# Patient Record
Sex: FEMALE | Race: WHITE | NOT HISPANIC OR LATINO
[De-identification: names, ages, dates, MRNs, and addresses within clinical notes are randomized per-mention and may not be internally consistent; named-entity substitution may affect disease eponyms.]

---

## 2018-11-21 ENCOUNTER — RECORD ABSTRACTING (OUTPATIENT)
Age: 73
End: 2018-11-21

## 2018-11-21 DIAGNOSIS — Z86.39 PERSONAL HISTORY OF OTHER ENDOCRINE, NUTRITIONAL AND METABOLIC DISEASE: ICD-10-CM

## 2018-11-21 DIAGNOSIS — S83.8X9A SPRAIN OF OTHER SPECIFIED PARTS OF UNSPECIFIED KNEE, INITIAL ENCOUNTER: ICD-10-CM

## 2018-11-21 DIAGNOSIS — Z86.19 PERSONAL HISTORY OF OTHER INFECTIOUS AND PARASITIC DISEASES: ICD-10-CM

## 2018-11-21 DIAGNOSIS — Z82.49 FAMILY HISTORY OF ISCHEMIC HEART DISEASE AND OTHER DISEASES OF THE CIRCULATORY SYSTEM: ICD-10-CM

## 2018-11-21 DIAGNOSIS — F17.200 NICOTINE DEPENDENCE, UNSPECIFIED, UNCOMPLICATED: ICD-10-CM

## 2018-11-21 DIAGNOSIS — Z86.79 PERSONAL HISTORY OF OTHER DISEASES OF THE CIRCULATORY SYSTEM: ICD-10-CM

## 2018-11-21 RX ORDER — PNV NO.95/FERROUS FUM/FOLIC AC 28MG-0.8MG
100 TABLET ORAL
Refills: 0 | Status: ACTIVE | COMMUNITY

## 2018-11-29 ENCOUNTER — APPOINTMENT (OUTPATIENT)
Dept: PAIN MANAGEMENT | Facility: CLINIC | Age: 73
End: 2018-11-29
Payer: MEDICARE

## 2018-11-29 VITALS
DIASTOLIC BLOOD PRESSURE: 70 MMHG | BODY MASS INDEX: 33.38 KG/M2 | SYSTOLIC BLOOD PRESSURE: 120 MMHG | HEIGHT: 60 IN | WEIGHT: 170 LBS

## 2018-11-29 PROCEDURE — 99214 OFFICE O/P EST MOD 30 MIN: CPT

## 2019-01-02 ENCOUNTER — APPOINTMENT (OUTPATIENT)
Dept: PAIN MANAGEMENT | Facility: CLINIC | Age: 74
End: 2019-01-02
Payer: MEDICARE

## 2019-01-02 VITALS
SYSTOLIC BLOOD PRESSURE: 134 MMHG | BODY MASS INDEX: 33.38 KG/M2 | WEIGHT: 170 LBS | DIASTOLIC BLOOD PRESSURE: 66 MMHG | HEIGHT: 60 IN

## 2019-01-02 PROCEDURE — 99214 OFFICE O/P EST MOD 30 MIN: CPT

## 2019-01-02 RX ORDER — NORTRIPTYLINE HYDROCHLORIDE 10 MG/1
10 CAPSULE ORAL
Refills: 0 | Status: DISCONTINUED | COMMUNITY
End: 2019-01-02

## 2019-01-02 RX ORDER — CYCLOBENZAPRINE HYDROCHLORIDE 10 MG/1
10 TABLET, FILM COATED ORAL
Refills: 0 | Status: DISCONTINUED | COMMUNITY
End: 2019-01-02

## 2019-01-02 RX ORDER — LIDOCAINE 50 MG/G
5 PATCH CUTANEOUS
Refills: 0 | Status: DISCONTINUED | COMMUNITY
End: 2019-01-02

## 2019-01-21 ENCOUNTER — TRANSCRIPTION ENCOUNTER (OUTPATIENT)
Age: 74
End: 2019-01-21

## 2019-03-04 ENCOUNTER — APPOINTMENT (OUTPATIENT)
Dept: PAIN MANAGEMENT | Facility: CLINIC | Age: 74
End: 2019-03-04

## 2019-03-15 ENCOUNTER — TRANSCRIPTION ENCOUNTER (OUTPATIENT)
Age: 74
End: 2019-03-15

## 2019-03-18 ENCOUNTER — APPOINTMENT (OUTPATIENT)
Dept: GASTROENTEROLOGY | Facility: CLINIC | Age: 74
End: 2019-03-18
Payer: MEDICARE

## 2019-03-18 VITALS
DIASTOLIC BLOOD PRESSURE: 60 MMHG | HEART RATE: 64 BPM | WEIGHT: 155 LBS | BODY MASS INDEX: 30.43 KG/M2 | SYSTOLIC BLOOD PRESSURE: 110 MMHG | HEIGHT: 60 IN

## 2019-03-18 DIAGNOSIS — M32.9 SYSTEMIC LUPUS ERYTHEMATOSUS, UNSPECIFIED: ICD-10-CM

## 2019-03-18 PROCEDURE — 99203 OFFICE O/P NEW LOW 30 MIN: CPT

## 2019-03-18 RX ORDER — GABAPENTIN 300 MG/1
300 CAPSULE ORAL
Refills: 0 | Status: DISCONTINUED | COMMUNITY
End: 2019-03-18

## 2019-03-18 RX ORDER — CHROMIUM 200 MCG
1000 TABLET ORAL
Refills: 0 | Status: ACTIVE | COMMUNITY

## 2019-03-18 RX ORDER — GABAPENTIN 300 MG/1
300 CAPSULE ORAL
Qty: 30 | Refills: 0 | Status: DISCONTINUED | COMMUNITY
Start: 2019-01-02 | End: 2019-03-18

## 2019-03-18 RX ORDER — CHOLECALCIFEROL (VITAMIN D3) 50 MCG
1000 CAPSULE ORAL
Refills: 0 | Status: ACTIVE | COMMUNITY

## 2019-03-18 NOTE — PHYSICAL EXAM
[General Appearance - Alert] : alert [General Appearance - In No Acute Distress] : in no acute distress [Sclera] : the sclera and conjunctiva were normal [Neck Appearance] : the appearance of the neck was normal [] : no respiratory distress [FreeTextEntry1] : deferred [Abdomen Soft] : soft [Abnormal Walk] : normal gait [No Focal Deficits] : no focal deficits [Skin Color & Pigmentation] : normal skin color and pigmentation [Oriented To Time, Place, And Person] : oriented to person, place, and time

## 2019-03-18 NOTE — CONSULT LETTER
[Dear  ___] : Dear  [unfilled], [Consult Letter:] : I had the pleasure of evaluating your patient, [unfilled]. [Please see my note below.] : Please see my note below. [Sincerely,] : Sincerely, [FreeTextEntry3] : David Lewis MD\par tel: [phone]\par fax: [phone]\par

## 2019-03-18 NOTE — HISTORY OF PRESENT ILLNESS
[de-identified] : ANDREW LOWERY  is being evaluated at the request of Dr. Haskinsfor an opinion re: history of colon polyp. Denies nausea, vomiting, fever, chills, diarrhea, constipation , melena, hematemesis. Colonoscopy in 2012  was notable for polyps ( adenoma)  / diverticulosis and hemorrhoids. Previously scheduled f/u colonoscopy was cancelled buy the patient. Colon findings in 2006 / 2002 were similar

## 2019-04-09 ENCOUNTER — APPOINTMENT (OUTPATIENT)
Dept: GASTROENTEROLOGY | Facility: HOSPITAL | Age: 74
End: 2019-04-09

## 2020-02-06 ENCOUNTER — APPOINTMENT (OUTPATIENT)
Dept: INTERNAL MEDICINE | Facility: CLINIC | Age: 75
End: 2020-02-06
Payer: MEDICARE

## 2020-02-06 VITALS
RESPIRATION RATE: 16 BRPM | OXYGEN SATURATION: 98 % | SYSTOLIC BLOOD PRESSURE: 116 MMHG | WEIGHT: 162 LBS | HEIGHT: 60 IN | HEART RATE: 73 BPM | TEMPERATURE: 97.6 F | DIASTOLIC BLOOD PRESSURE: 60 MMHG | BODY MASS INDEX: 31.8 KG/M2

## 2020-02-06 DIAGNOSIS — M54.2 CERVICALGIA: ICD-10-CM

## 2020-02-06 DIAGNOSIS — R92.2 INCONCLUSIVE MAMMOGRAM: ICD-10-CM

## 2020-02-06 DIAGNOSIS — Z86.79 PERSONAL HISTORY OF OTHER DISEASES OF THE CIRCULATORY SYSTEM: ICD-10-CM

## 2020-02-06 DIAGNOSIS — Z87.898 PERSONAL HISTORY OF OTHER SPECIFIED CONDITIONS: ICD-10-CM

## 2020-02-06 DIAGNOSIS — Z86.010 PERSONAL HISTORY OF COLONIC POLYPS: ICD-10-CM

## 2020-02-06 DIAGNOSIS — R94.39 ABNORMAL RESULT OF OTHER CARDIOVASCULAR FUNCTION STUDY: ICD-10-CM

## 2020-02-06 PROCEDURE — 99204 OFFICE O/P NEW MOD 45 MIN: CPT | Mod: 25

## 2020-02-06 PROCEDURE — G0009: CPT

## 2020-02-06 PROCEDURE — 90732 PPSV23 VACC 2 YRS+ SUBQ/IM: CPT

## 2020-02-06 RX ORDER — HYDROXYCHLOROQUINE SULFATE 200 MG/1
200 TABLET ORAL
Refills: 0 | Status: ACTIVE | COMMUNITY
Start: 2020-02-06

## 2020-02-06 NOTE — REVIEW OF SYSTEMS
[Joint Stiffness] : joint stiffness [Joint Pain] : joint pain [Joint Swelling] : no joint swelling [Skin Rash] : no skin rash [Negative] : Integumentary [FreeTextEntry9] : feet and arms

## 2020-02-06 NOTE — COUNSELING
[Willing to Quit Smoking] : Not willing to quit smoking [Risk of tobacco use and health benefits of smoking cessation discussed] : Risk of tobacco use and health benefits of smoking cessation discussed [Cessation strategies including cessation program discussed] : Cessation strategies including cessation program discussed

## 2020-03-13 ENCOUNTER — RESULT REVIEW (OUTPATIENT)
Age: 75
End: 2020-03-13

## 2020-03-23 ENCOUNTER — APPOINTMENT (OUTPATIENT)
Dept: INTERNAL MEDICINE | Facility: CLINIC | Age: 75
End: 2020-03-23
Payer: MEDICARE

## 2020-03-23 VITALS — SYSTOLIC BLOOD PRESSURE: 126 MMHG | DIASTOLIC BLOOD PRESSURE: 70 MMHG

## 2020-03-23 VITALS
TEMPERATURE: 98.1 F | WEIGHT: 156 LBS | HEIGHT: 60 IN | DIASTOLIC BLOOD PRESSURE: 88 MMHG | HEART RATE: 76 BPM | BODY MASS INDEX: 30.63 KG/M2 | SYSTOLIC BLOOD PRESSURE: 138 MMHG

## 2020-03-23 DIAGNOSIS — Z92.29 PERSONAL HISTORY OF OTHER DRUG THERAPY: ICD-10-CM

## 2020-03-23 DIAGNOSIS — Z76.89 PERSONS ENCOUNTERING HEALTH SERVICES IN OTHER SPECIFIED CIRCUMSTANCES: ICD-10-CM

## 2020-03-23 PROCEDURE — 99213 OFFICE O/P EST LOW 20 MIN: CPT | Mod: 25

## 2020-03-23 PROCEDURE — 99406 BEHAV CHNG SMOKING 3-10 MIN: CPT | Mod: 25

## 2020-03-23 PROCEDURE — G0439: CPT

## 2020-03-23 NOTE — COUNSELING
[Risk of tobacco use and health benefits of smoking cessation discussed] : Risk of tobacco use and health benefits of smoking cessation discussed [Cessation strategies including cessation program discussed] : Cessation strategies including cessation program discussed [Willing to Quit Smoking] : Not willing to quit smoking [Tobacco Use Cessation Intermediate Greater Than 3 Minutes Up to 10 Minutes] : Tobacco Use Cessation Intermediate Greater Than 3 Minutes Up to 10 Minutes [FreeTextEntry1] : 10

## 2020-03-23 NOTE — REVIEW OF SYSTEMS
[Negative] : Heme/Lymph [Itching] : no itching [Skin Rash] : skin rash [de-identified] : nack and eyelids, old

## 2020-03-23 NOTE — HEALTH RISK ASSESSMENT
[Good] : ~his/her~  mood as  good [] : Yes [6-10] : 6-10 [No] : No [No falls in past year] : Patient reported no falls in the past year [0] : 2) Feeling down, depressed, or hopeless: Not at all (0) [Patient reported mammogram was normal] : Patient reported mammogram was normal [Patient reported bone density results were abnormal] : Patient reported bone density results were abnormal [Patient reported colonoscopy was abnormal] : Patient reported colonoscopy was abnormal [HIV test declined] : HIV test declined [Hepatitis C test offered] : Hepatitis C test offered [With Family] : lives with family [Employed] : employed [College] : College [] :  [# Of Children ___] : has [unfilled] children [Feels Safe at Home] : Feels safe at home [With Patient/Caregiver] : With Patient/Caregiver [Designated Healthcare Proxy] : Designated healthcare proxy [Name: ___] : Health Care Proxy's Name: [unfilled]  [Relationship: ___] : Relationship: [unfilled] [[id number]] : 0 [MammogramDate] : 02/20 [PapSmearComments] : n/a [BoneDensityDate] : 01/14 [ColonoscopyDate] : 09/19 [ColonoscopyComments] : q2y [de-identified] : per dean [AdvancecareDate] : 03/23/20

## 2020-03-23 NOTE — PHYSICAL EXAM
[Normal] : normal sclera/conjunctiva, PERRL, EOMI [de-identified] : bilat dense no lumps [de-identified] : eyellid rash and post neck rash

## 2020-04-26 ENCOUNTER — MESSAGE (OUTPATIENT)
Age: 75
End: 2020-04-26

## 2020-05-01 ENCOUNTER — LABORATORY RESULT (OUTPATIENT)
Age: 75
End: 2020-05-01

## 2020-05-04 ENCOUNTER — RX RENEWAL (OUTPATIENT)
Age: 75
End: 2020-05-04

## 2020-05-05 LAB
ALBUMIN SERPL ELPH-MCNC: 4.4 G/DL
ALP BLD-CCNC: 65 U/L
ALT SERPL-CCNC: 17 U/L
ANION GAP SERPL CALC-SCNC: 18 MMOL/L
AST SERPL-CCNC: 23 U/L
BASOPHILS # BLD AUTO: 0.07 K/UL
BASOPHILS NFR BLD AUTO: 0.8 %
BILIRUB SERPL-MCNC: 0.2 MG/DL
BUN SERPL-MCNC: 22 MG/DL
CALCIUM SERPL-MCNC: 9.6 MG/DL
CHLORIDE SERPL-SCNC: 102 MMOL/L
CHOLEST SERPL-MCNC: 227 MG/DL
CHOLEST/HDLC SERPL: 4.7 RATIO
CO2 SERPL-SCNC: 19 MMOL/L
CREAT SERPL-MCNC: 0.74 MG/DL
EOSINOPHIL # BLD AUTO: 0.34 K/UL
EOSINOPHIL NFR BLD AUTO: 4.1 %
FOLATE SERPL-MCNC: >20 NG/ML
GLUCOSE SERPL-MCNC: 96 MG/DL
HBV SURFACE AB SER QL: REACTIVE
HCT VFR BLD CALC: 39.2 %
HDLC SERPL-MCNC: 48 MG/DL
HGB BLD-MCNC: 11.9 G/DL
IMM GRANULOCYTES NFR BLD AUTO: 0.4 %
LDLC SERPL CALC-MCNC: 149 MG/DL
LYMPHOCYTES # BLD AUTO: 1.82 K/UL
LYMPHOCYTES NFR BLD AUTO: 21.9 %
MAN DIFF?: NORMAL
MCHC RBC-ENTMCNC: 20 PG
MCHC RBC-ENTMCNC: 30.4 GM/DL
MCV RBC AUTO: 65.8 FL
MONOCYTES # BLD AUTO: 0.87 K/UL
MONOCYTES NFR BLD AUTO: 10.5 %
NEUTROPHILS # BLD AUTO: 5.17 K/UL
NEUTROPHILS NFR BLD AUTO: 62.3 %
PLATELET # BLD AUTO: 185 K/UL
POTASSIUM SERPL-SCNC: 4.6 MMOL/L
PROT SERPL-MCNC: 6.8 G/DL
RBC # BLD: 5.96 M/UL
RBC # FLD: 18 %
SODIUM SERPL-SCNC: 140 MMOL/L
T4 FREE SERPL-MCNC: 1.3 NG/DL
TRIGL SERPL-MCNC: 150 MG/DL
TSH SERPL-ACNC: 2.66 UIU/ML
WBC # FLD AUTO: 8.3 K/UL

## 2020-08-01 ENCOUNTER — RESULT REVIEW (OUTPATIENT)
Age: 75
End: 2020-08-01

## 2020-08-04 ENCOUNTER — TRANSCRIPTION ENCOUNTER (OUTPATIENT)
Age: 75
End: 2020-08-04

## 2020-08-26 ENCOUNTER — RESULT REVIEW (OUTPATIENT)
Age: 75
End: 2020-08-26

## 2020-11-02 ENCOUNTER — RX RENEWAL (OUTPATIENT)
Age: 75
End: 2020-11-02

## 2021-01-26 ENCOUNTER — RX RENEWAL (OUTPATIENT)
Age: 76
End: 2021-01-26

## 2021-02-02 ENCOUNTER — TRANSCRIPTION ENCOUNTER (OUTPATIENT)
Age: 76
End: 2021-02-02

## 2021-03-15 ENCOUNTER — RESULT REVIEW (OUTPATIENT)
Age: 76
End: 2021-03-15

## 2021-03-29 ENCOUNTER — APPOINTMENT (OUTPATIENT)
Dept: INTERNAL MEDICINE | Facility: CLINIC | Age: 76
End: 2021-03-29
Payer: MEDICARE

## 2021-03-29 VITALS
OXYGEN SATURATION: 98 % | HEIGHT: 60 IN | HEART RATE: 81 BPM | SYSTOLIC BLOOD PRESSURE: 122 MMHG | TEMPERATURE: 95.3 F | BODY MASS INDEX: 32.79 KG/M2 | DIASTOLIC BLOOD PRESSURE: 70 MMHG | WEIGHT: 167 LBS

## 2021-03-29 PROCEDURE — G0296 VISIT TO DETERM LDCT ELIG: CPT

## 2021-03-29 PROCEDURE — 99214 OFFICE O/P EST MOD 30 MIN: CPT | Mod: 25

## 2021-03-29 PROCEDURE — G0438: CPT

## 2021-03-30 NOTE — REVIEW OF SYSTEMS
[Skin Rash] : skin rash [Negative] : Heme/Lymph [Hair Changes] : hair changes [Itching] : no itching [de-identified] : neck and eyelids, old, hair improved

## 2021-03-30 NOTE — COUNSELING
[Risk of tobacco use and health benefits of smoking cessation discussed] : Risk of tobacco use and health benefits of smoking cessation discussed [ - Annual Lung Cancer Screening/Share Decision Making Discussion] : Annual Lung Cancer Screening/Share Decision Making Discussion. (I have advised this patient to have a Low Dose CT (LDCT) scan of the lungs and have discussed the following with the patient in a shared decision making discussion:   Benefits of Detection and Early Treatment: There is adequate evidence that annual screening for lung cancer with LDCT in a population of high-risk persons can prevent a substantial number of lung cancer–related deaths. The magnitude of benefit depends on the individual patient's risk for lung cancer, as those who are at highest risk are most likely to benefit. Screening cannot prevent most lung cancer–related deaths, and does not replace smoking cessation. Harms of Detection and Early Intervention and Treatment: The harms associated with LDCT screening include false-negative and false-positive results, incidental findings, over diagnosis, and radiation exposure. False-positive LDCT results occur in a substantial proportion of screened persons; 95% of all positive results do not lead to a diagnosis of cancer. In a high-quality screening program, further imaging can resolve most false-positive results; however, some patients may require invasive procedures. Radiation harms, including cancer resulting from cumulative exposure to radiation, vary depending on the age at the start of screening; the number of scans received; and the person's exposure to other sources of radiation, particularly other medical imaging.) [Willing to Quit Smoking] : Not willing to quit smoking [FreeTextEntry1] : 5

## 2021-03-30 NOTE — PHYSICAL EXAM
[Obese] : patient was observed to be obese [Normal] : normal gait, coordination grossly intact, no focal deficits [de-identified] : bilat dense no lumps [de-identified] : eyelid rash and post neck rash

## 2021-03-30 NOTE — HEALTH RISK ASSESSMENT
[Good] : ~his/her~  mood as  good [] : Yes [6-10] : 6-10 [No] : No [Patient reported mammogram was normal] : Patient reported mammogram was normal [Patient reported bone density results were abnormal] : Patient reported bone density results were abnormal [Patient reported colonoscopy was normal] : Patient reported colonoscopy was normal [HIV test declined] : HIV test declined [Hepatitis C test offered] : Hepatitis C test offered [With Family] : lives with family [Retired] : retired [College] : College [] :  [# Of Children ___] : has [unfilled] children [With Patient/Caregiver] : With Patient/Caregiver [Designated Healthcare Proxy] : Designated healthcare proxy [Name: ___] : Health Care Proxy's Name: [unfilled]  [Relationship: ___] : Relationship: [unfilled] [YearQuit] : current [MammogramDate] : 01/21 [PapSmearComments] : n/a [BoneDensityDate] : 01/20 [BoneDensityComments] : openia [ColonoscopyDate] : 01/20 [ColonoscopyComments] : q2y [AdvancecareDate] : 03/29/21

## 2021-04-05 ENCOUNTER — APPOINTMENT (OUTPATIENT)
Dept: THORACIC SURGERY | Facility: CLINIC | Age: 76
End: 2021-04-05
Payer: MEDICARE

## 2021-04-05 VITALS — BODY MASS INDEX: 30.43 KG/M2 | WEIGHT: 155 LBS | HEIGHT: 60 IN

## 2021-04-05 PROCEDURE — G0296 VISIT TO DETERM LDCT ELIG: CPT

## 2021-04-05 NOTE — PLAN
[Smoking Cessation] : smoking cessation [FreeTextEntry1] : Plan:\par \par -Low dose CT chest for lung cancer screening.    VIRGILIO MULTANI has been CC'd in order for LDCT.\par \par -Follow up with patient and her referring provider after her LDCT results have been reviewed by the multidisciplinary clinical team.\par \par -Encouraged smoking cessation.\par \par -Patient declines referral to CTC and or CCX\par \par Should I screen? tool utilized. 6 Year risk of lung cancer is 8.9%. Patient wishes to proceed with screening.\par \par Engaged in discussion regarding risks of screening during Covid-19 pandemic and precautions that are being used  to reduce exposure.\par \par Engaged in shared decision making with Ms. LOWERY . Answered all questions. She verbalized understanding and agreement. She knows to call back with and questions or concerns.\par

## 2021-04-05 NOTE — ASSESSMENT
[Discussed Risks and Advised to Quit Smoking] : Discussed risks and advised to quit smoking [Discussed Cessation Medication] : cessation medication was discussed [Not Ready] : Patient is not ready for cessation intervention [de-identified] : Acknowledged how  difficult it is to quit smoking. Advised that quitting smoking is the most important thing a person can do for their health. She reports using NRT patch in the past and was successful but started developing a localized reaction most likely from the adhesive. As she continued to use NRT patch she began having circumoral tingling with each use and stopped using. She is considering smoking cessation and verbalizes the need to quit but is concerned about weight gain and is not ready to quit at this time. She agrees to contact writer when ready to discuss smoking cessation further.

## 2021-04-05 NOTE — REASON FOR VISIT
[Home] : at home, [unfilled] , at the time of the visit. [Medical Office: ([facility])___] : at the medical office located in  [Verbal consent obtained from patient] : the patient, [unfilled] [Initial Evaluation] : an initial evaluation visit [Review of Eligibility] : review of eligibility [Low-Dose CT Screening Discussion] : low-dose CT lung cancer screening discussion

## 2021-04-05 NOTE — HISTORY OF PRESENT ILLNESS
[Current] : current smoker [_____ pack-years] : [unfilled] pack-years [TextBox_13] : Referred by Dr. Villela\par \par ANDREW LOWERY had telephonic visit for a review of eligibility and discussion of the Low dose CT lung cancer screening program. A telephonic visit occurred due to the patient not having access to a smart phone or a computer for an audio/visual visit.  The following was reviewed and confirmed the patient meets screening eligibility criteria.\par -Age 75 year\par Smoking Status:\par -Current smoker\par Stared smoking at 16 years old. Smoke 1PPD for 52 years, then a little less than 1/2 PPD for 8 years.\par -Number of pack years smokin \par \par Ms. LOWERY   denies any signs or symptoms of lung cancer including new cough, changing cough, hemoptysis, and unintentional weight loss.\par \par Ms. LOWERY  denies any personal history of lung cancer. Reports no lung cancer in a 1st degree relative. Denies and history of lung disease. Reports DX  Lupus, rheumatoid arthritis and thyroid nodule. Having thyroid 2021. Denies any history of  occupational exposures.\par

## 2021-04-06 ENCOUNTER — RESULT REVIEW (OUTPATIENT)
Age: 76
End: 2021-04-06

## 2021-04-07 ENCOUNTER — TRANSCRIPTION ENCOUNTER (OUTPATIENT)
Age: 76
End: 2021-04-07

## 2021-04-23 ENCOUNTER — RESULT REVIEW (OUTPATIENT)
Age: 76
End: 2021-04-23

## 2021-05-07 ENCOUNTER — NON-APPOINTMENT (OUTPATIENT)
Age: 76
End: 2021-05-07

## 2021-06-09 ENCOUNTER — LABORATORY RESULT (OUTPATIENT)
Age: 76
End: 2021-06-09

## 2021-06-10 LAB
25(OH)D3 SERPL-MCNC: 56.5 NG/ML
ALBUMIN SERPL ELPH-MCNC: 4.8 G/DL
ALP BLD-CCNC: 65 U/L
ALT SERPL-CCNC: 16 U/L
ANION GAP SERPL CALC-SCNC: 11 MMOL/L
APPEARANCE: CLEAR
AST SERPL-CCNC: 23 U/L
BASOPHILS # BLD AUTO: 0.09 K/UL
BASOPHILS NFR BLD AUTO: 1 %
BILIRUB SERPL-MCNC: 0.5 MG/DL
BILIRUBIN URINE: NEGATIVE
BLOOD URINE: NEGATIVE
BUN SERPL-MCNC: 23 MG/DL
CALCIUM SERPL-MCNC: 9.8 MG/DL
CHLORIDE SERPL-SCNC: 102 MMOL/L
CHOLEST SERPL-MCNC: 253 MG/DL
CO2 SERPL-SCNC: 25 MMOL/L
COLOR: YELLOW
CREAT SERPL-MCNC: 0.88 MG/DL
EOSINOPHIL # BLD AUTO: 0.31 K/UL
EOSINOPHIL NFR BLD AUTO: 3.6 %
GLUCOSE QUALITATIVE U: NEGATIVE
GLUCOSE SERPL-MCNC: 94 MG/DL
HCT VFR BLD CALC: 42.7 %
HCV AB SER QL: NONREACTIVE
HCV S/CO RATIO: 0.15 S/CO
HDLC SERPL-MCNC: 44 MG/DL
HGB BLD-MCNC: 13.1 G/DL
IMM GRANULOCYTES NFR BLD AUTO: 0.3 %
KETONES URINE: NEGATIVE
LDLC SERPL CALC-MCNC: 171 MG/DL
LEUKOCYTE ESTERASE URINE: ABNORMAL
LYMPHOCYTES # BLD AUTO: 2.16 K/UL
LYMPHOCYTES NFR BLD AUTO: 25.1 %
MAN DIFF?: NORMAL
MCHC RBC-ENTMCNC: 19.7 PG
MCHC RBC-ENTMCNC: 30.7 GM/DL
MCV RBC AUTO: 64.2 FL
MONOCYTES # BLD AUTO: 0.98 K/UL
MONOCYTES NFR BLD AUTO: 11.4 %
NEUTROPHILS # BLD AUTO: 5.05 K/UL
NEUTROPHILS NFR BLD AUTO: 58.6 %
NITRITE URINE: NEGATIVE
NONHDLC SERPL-MCNC: 208 MG/DL
PH URINE: 5.5
PLATELET # BLD AUTO: 173 K/UL
POTASSIUM SERPL-SCNC: 4.7 MMOL/L
PROT SERPL-MCNC: 7 G/DL
PROTEIN URINE: NORMAL
RBC # BLD: 6.65 M/UL
RBC # FLD: 18.1 %
SODIUM SERPL-SCNC: 139 MMOL/L
SPECIFIC GRAVITY URINE: 1.02
T4 FREE SERPL-MCNC: 1.4 NG/DL
TRIGL SERPL-MCNC: 183 MG/DL
TSH SERPL-ACNC: 0.01 UIU/ML
UROBILINOGEN URINE: NORMAL
VIT B12 SERPL-MCNC: 911 PG/ML
WBC # FLD AUTO: 8.62 K/UL

## 2021-06-10 RX ORDER — LEVOTHYROXINE SODIUM 0.07 MG/1
75 TABLET ORAL
Qty: 90 | Refills: 0 | Status: DISCONTINUED | COMMUNITY
Start: 2020-05-04 | End: 2021-06-10

## 2021-07-20 ENCOUNTER — TRANSCRIPTION ENCOUNTER (OUTPATIENT)
Age: 76
End: 2021-07-20

## 2021-07-21 ENCOUNTER — TRANSCRIPTION ENCOUNTER (OUTPATIENT)
Age: 76
End: 2021-07-21

## 2021-07-26 LAB
T4 FREE SERPL-MCNC: 1.2 NG/DL
TSH SERPL-ACNC: 3 UIU/ML

## 2021-09-07 ENCOUNTER — RX RENEWAL (OUTPATIENT)
Age: 76
End: 2021-09-07

## 2021-09-10 ENCOUNTER — TRANSCRIPTION ENCOUNTER (OUTPATIENT)
Age: 76
End: 2021-09-10

## 2021-09-13 ENCOUNTER — TRANSCRIPTION ENCOUNTER (OUTPATIENT)
Age: 76
End: 2021-09-13

## 2021-09-22 LAB
T4 FREE SERPL-MCNC: 1.1 NG/DL
TSH SERPL-ACNC: 3.43 UIU/ML

## 2021-10-04 ENCOUNTER — NON-APPOINTMENT (OUTPATIENT)
Age: 76
End: 2021-10-04

## 2021-10-13 ENCOUNTER — RESULT REVIEW (OUTPATIENT)
Age: 76
End: 2021-10-13

## 2022-02-03 ENCOUNTER — APPOINTMENT (OUTPATIENT)
Dept: INTERNAL MEDICINE | Facility: CLINIC | Age: 77
End: 2022-02-03
Payer: MEDICARE

## 2022-02-03 VITALS
BODY MASS INDEX: 31.41 KG/M2 | TEMPERATURE: 96.5 F | DIASTOLIC BLOOD PRESSURE: 70 MMHG | SYSTOLIC BLOOD PRESSURE: 132 MMHG | OXYGEN SATURATION: 96 % | HEART RATE: 75 BPM | HEIGHT: 60 IN | WEIGHT: 160 LBS

## 2022-02-03 DIAGNOSIS — Z71.85 ENCOUNTER FOR IMMUNIZATION SAFETY COUNSELING: ICD-10-CM

## 2022-02-03 PROCEDURE — 99214 OFFICE O/P EST MOD 30 MIN: CPT

## 2022-02-03 NOTE — HISTORY OF PRESENT ILLNESS
[de-identified] : Roula comes in today concerned about changing in her TSH level last year.  She is concerned is related to her COVID vaccine and would like to not take the booster vaccine.  She is a smoker with 3 lung nodules under observation and immunosuppression from lupus on active treatment.  She feels a little sluggish on current dose levothyroxine.  But she stopped biotin that in retrospect possibly  interfered with TSH measuring.

## 2022-02-03 NOTE — HEALTH RISK ASSESSMENT
[Current] : Current [No] : No [0] : 2) Feeling down, depressed, or hopeless: Not at all (0) [PHQ-2 Negative - No further assessment needed] : PHQ-2 Negative - No further assessment needed [[id number]] : 0

## 2022-04-28 ENCOUNTER — NON-APPOINTMENT (OUTPATIENT)
Age: 77
End: 2022-04-28

## 2022-04-28 ENCOUNTER — APPOINTMENT (OUTPATIENT)
Dept: INTERNAL MEDICINE | Facility: CLINIC | Age: 77
End: 2022-04-28
Payer: MEDICARE

## 2022-04-28 VITALS
OXYGEN SATURATION: 97 % | HEIGHT: 60 IN | HEART RATE: 84 BPM | WEIGHT: 160 LBS | TEMPERATURE: 96.1 F | SYSTOLIC BLOOD PRESSURE: 122 MMHG | DIASTOLIC BLOOD PRESSURE: 60 MMHG | BODY MASS INDEX: 31.41 KG/M2

## 2022-04-28 DIAGNOSIS — Z86.69 PERSONAL HISTORY OF OTHER DISEASES OF THE NERVOUS SYSTEM AND SENSE ORGANS: ICD-10-CM

## 2022-04-28 DIAGNOSIS — M47.812 SPONDYLOSIS W/OUT MYELOPATHY OR RADICULOPATHY, CERVICAL REGION: ICD-10-CM

## 2022-04-28 DIAGNOSIS — Z00.00 ENCOUNTER FOR GENERAL ADULT MEDICAL EXAMINATION W/OUT ABNORMAL FINDINGS: ICD-10-CM

## 2022-04-28 PROCEDURE — 93000 ELECTROCARDIOGRAM COMPLETE: CPT

## 2022-04-28 PROCEDURE — 99397 PER PM REEVAL EST PAT 65+ YR: CPT | Mod: 25

## 2022-04-28 NOTE — PHYSICAL EXAM
[Obese] : patient was observed to be obese [Normal] : normal gait, coordination grossly intact, no focal deficits [de-identified] : bilat dense no  gross lumps [de-identified] :  post neck discoloration

## 2022-04-28 NOTE — HISTORY OF PRESENT ILLNESS
[de-identified] : here for annual exam , doing well, still smoking , not willing to take statin for high lipids, ("I do not like pills")

## 2022-04-28 NOTE — HEALTH RISK ASSESSMENT
[Good] : ~his/her~  mood as  good [Current] : Current [10-14] : 10-14 [No] : No [0] : 2) Feeling down, depressed, or hopeless: Not at all (0) [PHQ-2 Negative - No further assessment needed] : PHQ-2 Negative - No further assessment needed [Patient reported mammogram was normal] : Patient reported mammogram was normal [Patient reported bone density results were abnormal] : Patient reported bone density results were abnormal [Patient reported colonoscopy was normal] : Patient reported colonoscopy was normal [Hepatitis C test offered] : Hepatitis C test offered [With Family] : lives with family [Employed] : employed [College] : College [] :  [# Of Children ___] : has [unfilled] children [[id number]] : 0 [MammogramDate] : 01/21 [BoneDensityDate] : 01/20 [ColonoscopyDate] : 01/19 [ColonoscopyComments] : 5 yr f /u

## 2022-04-28 NOTE — REVIEW OF SYSTEMS
[Negative] : Heme/Lymph [Headache] : no headache [Dizziness] : no dizziness [Memory Loss] : no memory loss [de-identified] : paresthesias R post neck and uper back after shingles

## 2022-05-06 ENCOUNTER — RX RENEWAL (OUTPATIENT)
Age: 77
End: 2022-05-06

## 2022-05-23 ENCOUNTER — RESULT REVIEW (OUTPATIENT)
Age: 77
End: 2022-05-23

## 2022-07-12 ENCOUNTER — LABORATORY RESULT (OUTPATIENT)
Age: 77
End: 2022-07-12

## 2022-07-13 LAB
ALBUMIN SERPL ELPH-MCNC: 4.5 G/DL
ALP BLD-CCNC: 65 U/L
ALT SERPL-CCNC: 17 U/L
ANION GAP SERPL CALC-SCNC: 12 MMOL/L
APPEARANCE: CLEAR
AST SERPL-CCNC: 23 U/L
BILIRUB SERPL-MCNC: 0.3 MG/DL
BILIRUBIN URINE: NEGATIVE
BLOOD URINE: NEGATIVE
BUN SERPL-MCNC: 19 MG/DL
CALCIUM SERPL-MCNC: 9.6 MG/DL
CHLORIDE SERPL-SCNC: 103 MMOL/L
CHOLEST SERPL-MCNC: 243 MG/DL
CO2 SERPL-SCNC: 24 MMOL/L
COLOR: YELLOW
CREAT SERPL-MCNC: 1 MG/DL
EGFR: 58 ML/MIN/1.73M2
ESTIMATED AVERAGE GLUCOSE: 108 MG/DL
FOLATE SERPL-MCNC: >20 NG/ML
GLUCOSE QUALITATIVE U: NEGATIVE
GLUCOSE SERPL-MCNC: 87 MG/DL
HBA1C MFR BLD HPLC: 5.4 %
HDLC SERPL-MCNC: 51 MG/DL
KETONES URINE: NEGATIVE
LDLC SERPL CALC-MCNC: 172 MG/DL
LEUKOCYTE ESTERASE URINE: ABNORMAL
NITRITE URINE: NEGATIVE
NONHDLC SERPL-MCNC: 192 MG/DL
PH URINE: 5.5
POTASSIUM SERPL-SCNC: 4.9 MMOL/L
PROT SERPL-MCNC: 6.7 G/DL
PROTEIN URINE: ABNORMAL
SODIUM SERPL-SCNC: 140 MMOL/L
SPECIFIC GRAVITY URINE: 1.02
T4 FREE SERPL-MCNC: 1.4 NG/DL
TRIGL SERPL-MCNC: 102 MG/DL
TSH SERPL-ACNC: 1.52 UIU/ML
UROBILINOGEN URINE: NORMAL
VIT B12 SERPL-MCNC: 1048 PG/ML

## 2022-07-14 LAB
BASOPHILS # BLD AUTO: 0.08 K/UL
BASOPHILS NFR BLD AUTO: 1 %
EOSINOPHIL # BLD AUTO: 0.18 K/UL
EOSINOPHIL NFR BLD AUTO: 2.2 %
HCT VFR BLD CALC: 38.8 %
HGB BLD-MCNC: 12.1 G/DL
IMM GRANULOCYTES NFR BLD AUTO: 0.2 %
LYMPHOCYTES # BLD AUTO: 1.43 K/UL
LYMPHOCYTES NFR BLD AUTO: 17.4 %
MAN DIFF?: NORMAL
MCHC RBC-ENTMCNC: 19.7 PG
MCHC RBC-ENTMCNC: 31.2 GM/DL
MCV RBC AUTO: 63.3 FL
MONOCYTES # BLD AUTO: 0.68 K/UL
MONOCYTES NFR BLD AUTO: 8.3 %
NEUTROPHILS # BLD AUTO: 5.84 K/UL
NEUTROPHILS NFR BLD AUTO: 70.9 %
PLATELET # BLD AUTO: 172 K/UL
RBC # BLD: 6.13 M/UL
RBC # FLD: 18.3 %
WBC # FLD AUTO: 8.23 K/UL

## 2022-08-05 ENCOUNTER — RESULT REVIEW (OUTPATIENT)
Age: 77
End: 2022-08-05

## 2022-09-26 ENCOUNTER — RX RENEWAL (OUTPATIENT)
Age: 77
End: 2022-09-26

## 2022-09-26 ENCOUNTER — TRANSCRIPTION ENCOUNTER (OUTPATIENT)
Age: 77
End: 2022-09-26

## 2022-11-07 ENCOUNTER — RX RENEWAL (OUTPATIENT)
Age: 77
End: 2022-11-07

## 2022-11-08 ENCOUNTER — TRANSCRIPTION ENCOUNTER (OUTPATIENT)
Age: 77
End: 2022-11-08

## 2022-11-14 ENCOUNTER — APPOINTMENT (OUTPATIENT)
Dept: THORACIC SURGERY | Facility: CLINIC | Age: 77
End: 2022-11-14

## 2022-11-14 VITALS — WEIGHT: 155 LBS | BODY MASS INDEX: 30.43 KG/M2 | HEIGHT: 60 IN

## 2022-11-14 PROCEDURE — ACP01: CPT | Mod: NC

## 2022-11-14 NOTE — ASSESSMENT
[Declined] : Patient has declined cessation intervention [de-identified] : Is trying to cut down. Verbalizes the need to quit smoking.

## 2022-11-14 NOTE — DATA REVIEWED
[Lung Cancer Screening] : Patient underwent lung cancer screening [3] : 3 [2] : 2 [TextBox_12] : 04/21 [TextBox_27] : 10/21 [TextBox_52] : 2

## 2022-11-14 NOTE — HISTORY OF PRESENT ILLNESS
[Current] : Current [TextBox_13] : Referred by Dr. Villela\par \par ANDREW LOWERY had telephonic visit for a review of eligibility and discussion of the Low dose CT lung cancer screening program. A telephonic visit occurred due to the patient not having access to a smart phone or a computer for an audio/visual visit. The following was reviewed and confirmed the patient meets screening eligibility criteria.\par Smoking Status:\par -Current smoker\par Stared smoking at 16 years old. Smoke 1 PPD for 52 years, then a little less than 1/2 PPD for 8 years. Then 8 cigarettes per day for the past year\par -Number of pack years smokin \par \par Ms. LOWERY denies any signs or symptoms of lung cancer including new cough, changing cough, hemoptysis, and unintentional weight loss.\par \par Ms. LOWERY reports DX Lupus, rheumatoid arthritis, hypothyroid, and thyroid nodule. denies She any personal history of lung cancer. Reports no lung cancer in a 1st degree relative. Reports no lung cancer in a 2nd degree relative. Denies and history of lung disease. Denies any history of occupational exposures. Denies exposure to 2nd hand smoke\par  [PacksperYear] : 56

## 2022-11-14 NOTE — PLAN
[Smoking Cessation] : smoking cessation [FreeTextEntry1] : Plan:\par \par -Low dose CT chest for lung cancer screening. VIRGILIO MULTANI ordered the low dose CT.      \par \par -Follow up with patient and her referring provider after her LDCT results have been reviewed by the multidisciplinary clinical team, if needed.\par \par -Encouraged smoking cessation.\par \par -Patient declines referral to CTC and CCX\par \par Should I screen? tool utilized. 6 Year risk of lung cancer is  10.5 %. \par \par Patient wishes to proceed with screening.\par \par Engaged in discussion regarding risks of screening during Covid-19 pandemic and precautions that are being used  to reduce exposure.\par \par Engaged in shared decision making with Ms. LOWERY . Answered all questions. She verbalized understanding and agreement. She knows to call back with and questions or concerns.\par

## 2022-11-21 ENCOUNTER — RESULT REVIEW (OUTPATIENT)
Age: 77
End: 2022-11-21

## 2022-11-23 ENCOUNTER — NON-APPOINTMENT (OUTPATIENT)
Age: 77
End: 2022-11-23

## 2022-12-21 ENCOUNTER — TRANSCRIPTION ENCOUNTER (OUTPATIENT)
Age: 77
End: 2022-12-21

## 2023-03-27 ENCOUNTER — RX RENEWAL (OUTPATIENT)
Age: 78
End: 2023-03-27

## 2023-05-08 ENCOUNTER — APPOINTMENT (OUTPATIENT)
Dept: INTERNAL MEDICINE | Facility: CLINIC | Age: 78
End: 2023-05-08
Payer: MEDICARE

## 2023-05-08 VITALS
BODY MASS INDEX: 33.06 KG/M2 | HEIGHT: 59 IN | HEART RATE: 81 BPM | OXYGEN SATURATION: 95 % | WEIGHT: 164 LBS | DIASTOLIC BLOOD PRESSURE: 60 MMHG | TEMPERATURE: 97.2 F | SYSTOLIC BLOOD PRESSURE: 100 MMHG

## 2023-05-08 DIAGNOSIS — Z23 ENCOUNTER FOR IMMUNIZATION: ICD-10-CM

## 2023-05-08 DIAGNOSIS — R91.1 SOLITARY PULMONARY NODULE: ICD-10-CM

## 2023-05-08 DIAGNOSIS — Z86.39 PERSONAL HISTORY OF OTHER ENDOCRINE, NUTRITIONAL AND METABOLIC DISEASE: ICD-10-CM

## 2023-05-08 DIAGNOSIS — L93.0 DISCOID LUPUS ERYTHEMATOSUS: ICD-10-CM

## 2023-05-08 PROCEDURE — 99407 BEHAV CHNG SMOKING > 10 MIN: CPT

## 2023-05-08 PROCEDURE — G0439: CPT

## 2023-05-08 PROCEDURE — G0009: CPT

## 2023-05-08 PROCEDURE — 99214 OFFICE O/P EST MOD 30 MIN: CPT | Mod: 25

## 2023-05-08 PROCEDURE — 90677 PCV20 VACCINE IM: CPT

## 2023-05-08 RX ORDER — ROSUVASTATIN CALCIUM 5 MG/1
5 TABLET, FILM COATED ORAL
Qty: 90 | Refills: 0 | Status: DISCONTINUED | COMMUNITY
Start: 2022-07-14 | End: 2023-05-08

## 2023-05-08 NOTE — COUNSELING
[Yes] : Risk of tobacco use and health benefits of smoking cessation discussed: Yes [No] : Not willing to quit smoking [FreeTextEntry2] : refused [FreeTextEntry3] : 12

## 2023-05-08 NOTE — PHYSICAL EXAM
[Obese] : patient was observed to be obese [Normal] : normal gait, coordination grossly intact, no focal deficits [de-identified] : bilat dense no  gross lumps bilat [de-identified] : R foot deformity , valgus deviation [de-identified] :  post neck discoloration

## 2023-05-08 NOTE — HISTORY OF PRESENT ILLNESS
[de-identified] : here for wellness exam, R chronic foot pain, did not start Crestor  or see the cardiologist, still smokes, but less 1/2 ppd

## 2023-05-08 NOTE — REVIEW OF SYSTEMS
[Negative] : Heme/Lymph [Joint Pain] : joint pain [Headache] : no headache [Dizziness] : no dizziness [Memory Loss] : no memory loss [FreeTextEntry9] : R ankle and foot after multiple injuries [de-identified] : occasional dysesthesias R post neck and upper back after shingles

## 2023-05-08 NOTE — HEALTH RISK ASSESSMENT
[Good] : ~his/her~  mood as  good [No] : No [No falls in past year] : Patient reported no falls in the past year [0] : 2) Feeling down, depressed, or hopeless: Not at all (0) [PHQ-2 Negative - No further assessment needed] : PHQ-2 Negative - No further assessment needed [Patient reported mammogram was normal] : Patient reported mammogram was normal [Patient reported bone density results were abnormal] : Patient reported bone density results were abnormal [Patient reported colonoscopy was normal] : Patient reported colonoscopy was normal [HIV test declined] : HIV test declined [Hepatitis C test offered] : Hepatitis C test offered [With Family] : lives with family [Employed] : employed [College] : College [] :  [# Of Children ___] : has [unfilled] children [Fully functional (bathing, dressing, toileting, transferring, walking, feeding)] : Fully functional (bathing, dressing, toileting, transferring, walking, feeding) [Fully functional (using the telephone, shopping, preparing meals, housekeeping, doing laundry, using] : Fully functional and needs no help or supervision to perform IADLs (using the telephone, shopping, preparing meals, housekeeping, doing laundry, using transportation, managing medications and managing finances) [Patient/Caregiver not ready to engage] : , patient/caregiver not ready to engage [Current] : Current [10-14] : 10-14 [[id number]] : 0 [Reports changes in hearing] : Reports no changes in hearing [Reports changes in vision] : Reports no changes in vision [MammogramDate] : 01/22 [BoneDensityDate] : 01/20 [BoneDensityComments] : osteopenia [ColonoscopyDate] : 01/19

## 2023-05-24 ENCOUNTER — RESULT REVIEW (OUTPATIENT)
Age: 78
End: 2023-05-24

## 2023-08-04 ENCOUNTER — TRANSCRIPTION ENCOUNTER (OUTPATIENT)
Age: 78
End: 2023-08-04

## 2023-08-07 ENCOUNTER — TRANSCRIPTION ENCOUNTER (OUTPATIENT)
Age: 78
End: 2023-08-07

## 2023-09-02 ENCOUNTER — RESULT REVIEW (OUTPATIENT)
Age: 78
End: 2023-09-02

## 2023-09-27 ENCOUNTER — TRANSCRIPTION ENCOUNTER (OUTPATIENT)
Age: 78
End: 2023-09-27

## 2023-10-04 ENCOUNTER — TRANSCRIPTION ENCOUNTER (OUTPATIENT)
Age: 78
End: 2023-10-04

## 2023-10-11 ENCOUNTER — TRANSCRIPTION ENCOUNTER (OUTPATIENT)
Age: 78
End: 2023-10-11

## 2024-01-10 ENCOUNTER — RX RENEWAL (OUTPATIENT)
Age: 79
End: 2024-01-10

## 2024-01-12 ENCOUNTER — TRANSCRIPTION ENCOUNTER (OUTPATIENT)
Age: 79
End: 2024-01-12

## 2024-01-12 ENCOUNTER — APPOINTMENT (OUTPATIENT)
Dept: THORACIC SURGERY | Facility: CLINIC | Age: 79
End: 2024-01-12
Payer: MEDICARE

## 2024-01-12 VITALS — BODY MASS INDEX: 31.41 KG/M2 | HEIGHT: 60 IN | WEIGHT: 160 LBS

## 2024-01-12 PROCEDURE — ACP01: CPT | Mod: NC

## 2024-01-12 NOTE — PLAN
[Smoking Cessation] : smoking cessation [FreeTextEntry1] : Plan:  -Low dose CT chest for lung cancer screening. VIRGILIO MULTANI ordered the low dose CT.       -Follow up with  her referring provider after her LDCT results have been reviewed by the multidisciplinary clinical team, if needed.   -Encouraged smoking cessation.   -Patient declines referral to CTC and CCX   Should I screen? tool utilized. 6 Year risk of lung cancer is   11.9%.   Patient wishes to proceed with screening.  Engaged in discussion regarding risks of screening during Covid-19 pandemic and precautions that are being used  to reduce exposure.  Engaged in shared decision making with MsKianna BEVERLEY . Answered all questions. She verbalized understanding and agreement. She knows to call back with and questions or concerns.

## 2024-01-12 NOTE — ASSESSMENT
[Not Ready] : Patient is not ready for cessation intervention [Contemplation] : Contemplation: The patient is considering quitting smoking [de-identified] : Acknowledged how difficult it was to quit smoking. Advised that quitting smoking is the most important thing a person can do for their health.  She agrees to call writer at any point in the future he wishes to discuss smoking cessation programs with [facility]. PROCEDURES:  Repeat  section 20-Dec-2019 10:02:32  Concepcion Camacho

## 2024-01-12 NOTE — DATA REVIEWED
[Lung Cancer Screening] : Patient underwent lung cancer screening [3] : 3 [2] : 2 [TextBox_12] : 04/21 [TextBox_27] : 10/21 [TextBox_42] : 11/22 [TextBox_52] : 3

## 2024-01-12 NOTE — HISTORY OF PRESENT ILLNESS
[Current] : Current [TextBox_13] : Responded to reminder PCP Dr. Tika MORALES BEVERLEY had telephonic visit for a review of eligibility and discussion of the Low dose CT lung cancer screening program. A telephonic visit occurred due to the patient not having access to a smart phone or a computer for an audio/visual visit. The following was reviewed and confirmed the patient meets screening eligibility criteria. Smoking Status: -Current smoker Stared smoking at 16 years old. Smoke 1 PPD for 52 years, then a little less than 1/2 PPD for 8 years. Then 8 cigarettes per day for 2 years. -Number of pack years smokin  Ms. LOWERY denies any signs or symptoms of lung cancer including new cough, changing cough, hemoptysis, and unintentional weight loss.  Ms. LOWERY reports DX Lupus, rheumatoid arthritis, hypothyroid, and thyroid nodule. She denies DX of lung disease, Covid infection, heart disease, and any personal history of cancer.  Reports no lung cancer in a 1st degree relative. Reports no lung cancer in a 2nd degree relative. Denies any history of environmental and occupational exposures. Denies exposure to 2nd hand smoke.  [PacksperYear] : 57

## 2024-01-16 ENCOUNTER — NON-APPOINTMENT (OUTPATIENT)
Age: 79
End: 2024-01-16

## 2024-01-23 ENCOUNTER — RESULT REVIEW (OUTPATIENT)
Age: 79
End: 2024-01-23

## 2024-04-09 ENCOUNTER — RX RENEWAL (OUTPATIENT)
Age: 79
End: 2024-04-09

## 2024-04-09 RX ORDER — LEVOTHYROXINE SODIUM 0.07 MG/1
75 TABLET ORAL
Qty: 90 | Refills: 0 | Status: ACTIVE | COMMUNITY
Start: 2021-06-10 | End: 1900-01-01

## 2024-05-01 ENCOUNTER — APPOINTMENT (OUTPATIENT)
Dept: GERIATRICS | Facility: CLINIC | Age: 79
End: 2024-05-01
Payer: MEDICARE

## 2024-05-01 VITALS
HEART RATE: 73 BPM | WEIGHT: 160 LBS | HEIGHT: 60 IN | OXYGEN SATURATION: 96 % | BODY MASS INDEX: 31.41 KG/M2 | TEMPERATURE: 96.8 F | SYSTOLIC BLOOD PRESSURE: 130 MMHG | DIASTOLIC BLOOD PRESSURE: 74 MMHG

## 2024-05-01 DIAGNOSIS — M85.80 OTHER SPECIFIED DISORDERS OF BONE DENSITY AND STRUCTURE, UNSPECIFIED SITE: ICD-10-CM

## 2024-05-01 DIAGNOSIS — D56.3 THALASSEMIA MINOR: ICD-10-CM

## 2024-05-01 DIAGNOSIS — M06.9 RHEUMATOID ARTHRITIS, UNSPECIFIED: ICD-10-CM

## 2024-05-01 DIAGNOSIS — E03.9 HYPOTHYROIDISM, UNSPECIFIED: ICD-10-CM

## 2024-05-01 DIAGNOSIS — F17.210 NICOTINE DEPENDENCE, CIGARETTES, UNCOMPLICATED: ICD-10-CM

## 2024-05-01 PROCEDURE — 99417 PROLNG OP E/M EACH 15 MIN: CPT

## 2024-05-01 PROCEDURE — 99215 OFFICE O/P EST HI 40 MIN: CPT

## 2024-05-01 PROCEDURE — G2211 COMPLEX E/M VISIT ADD ON: CPT

## 2024-05-01 PROCEDURE — G0444 DEPRESSION SCREEN ANNUAL: CPT | Mod: 59

## 2024-05-01 RX ORDER — LYSINE HCL 500 MG
600 TABLET ORAL DAILY
Qty: 90 | Refills: 3 | Status: ACTIVE | COMMUNITY
Start: 2024-05-01

## 2024-05-01 RX ORDER — TACROLIMUS 1 MG/G
0.1 OINTMENT TOPICAL
Qty: 60 | Refills: 0 | Status: DISCONTINUED | COMMUNITY
Start: 2021-01-20 | End: 2024-05-01

## 2024-05-01 RX ORDER — ROSUVASTATIN CALCIUM 5 MG/1
5 TABLET, FILM COATED ORAL
Qty: 90 | Refills: 3 | Status: DISCONTINUED | COMMUNITY
Start: 2023-05-08 | End: 2024-05-01

## 2024-05-01 NOTE — HISTORY OF PRESENT ILLNESS
[Patient reported hearing was normal] : Patient reported hearing was normal [Patient reported vision is normal] : Patient reported vision is normal [Patient reported colon/rectal/cancer screening was normal] : Patient reported colon/rectal cancer screening was normal [Patient reported breast cancer screening was normal] : Patient reported breast cancer screening was normal [Patient reported cervical cancer screening was normal] : Patient reported cervical cancer screening was normal [No falls in past year] : Patient reported no falls in the past year [Completely Independent] : Completely independent. [0] : 2) Feeling down, depressed, or hopeless: Not at all (0) [PHQ-2 Negative - No further assessment needed] : PHQ-2 Negative - No further assessment needed [I have developed a follow-up plan documented below in the note.] : I have developed a follow-up plan documented below in the note. [FreeTextEntry1] : ANDREW LOWERY is a 78 year yo White  female is coming in today to establish care. Patient has PMHx as listed below   pain in her shoulder to lift the arms up. CPK 300s raised. pain since 4-5 months.   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  she enjoys smoking since she was 17yo. no alcohol Weight loss/malnutrition: no : ; 2 kids Immunization: done Screening: done Depression screening: done Medication reconciliation: done Allergies: done   exercise not much   4M Geriatric Screening Tests   Based on The 4Ms While Caring for Older Adults, an evidence-based focus on the key areas of mentation, mobility, medications, and what matters most (a guide by the [place] [employer] and the [employer])     Medications: -Anticholinergic burden:[ ]     Mobility:   -Chronic pain:[ ] -Constipation: no -Urinary symptoms: no     Frail Scale: 0/5   -Are you fatigued? y -Can you walk up one flight of stairs? no -Can you walk one block?  yes -Do you have more than 5 illnesses?[ ] -Have you lost more than 5% of your weight in last 6 months?[ ]       Mentation: -Hx of dementia:[ ] -h/o delirium:[ ] -Cognitive enhancing agents:[ ]       Sleep: STOP-BANG Snoring:[ ] Tiredness:[ ] Observed Apnea:[ ] Pressure: High blood pressure[ ] BMI: higher than 35.[ ] Age: older than 50[ ] Neck Circumference: greater than 16 inches is considered a risk factor.[ ] Gender: Males[ ]     Matters Most     [BreastSonogramDate] : 08/2020 [TextBox_25] : osteopenia -1.7T. will get bone density scan next year. [TextBox_37] : every 6 months [Mammogramdate] : 04/2019 [TextBox_19] : every 5 yrs. due; will reach out to GI [FreeTextEntry3] : 05/2023

## 2024-05-01 NOTE — PHYSICAL EXAM
[No Respiratory Distress] : no respiratory distress [No Acc Muscle Use] : no accessory muscle use [Respiration, Rhythm And Depth] : normal respiratory rhythm and effort [Auscultation Breath Sounds / Voice Sounds] : lungs were clear to auscultation bilaterally [Normal S1, S2] : normal S1 and S2 [Heart Rate And Rhythm] : heart rate was normal and rhythm regular [Version 1] : Word list version 1 - Banana, Sunrise, Chair [Normal Clock Drawn, with correct arm position (2 points)] : normal clock drawn, with correct arm position (2 points) [3 Words (3 points)] : 3 words (3 points) [5 Points] : 5 points

## 2024-05-20 DIAGNOSIS — Z12.31 ENCOUNTER FOR SCREENING MAMMOGRAM FOR MALIGNANT NEOPLASM OF BREAST: ICD-10-CM

## 2024-05-23 ENCOUNTER — TRANSCRIPTION ENCOUNTER (OUTPATIENT)
Age: 79
End: 2024-05-23

## 2024-05-24 ENCOUNTER — RESULT REVIEW (OUTPATIENT)
Age: 79
End: 2024-05-24

## 2024-05-24 DIAGNOSIS — E78.5 HYPERLIPIDEMIA, UNSPECIFIED: ICD-10-CM

## 2024-05-24 RX ORDER — SIMVASTATIN 10 MG/1
10 TABLET, FILM COATED ORAL
Qty: 30 | Refills: 3 | Status: ACTIVE | COMMUNITY
Start: 2024-05-24 | End: 1900-01-01

## 2024-06-05 ENCOUNTER — APPOINTMENT (OUTPATIENT)
Dept: GERIATRICS | Facility: CLINIC | Age: 79
End: 2024-06-05
Payer: MEDICARE

## 2024-06-05 VITALS
TEMPERATURE: 97 F | WEIGHT: 166 LBS | OXYGEN SATURATION: 94 % | BODY MASS INDEX: 33.47 KG/M2 | DIASTOLIC BLOOD PRESSURE: 64 MMHG | HEIGHT: 59 IN | SYSTOLIC BLOOD PRESSURE: 130 MMHG | HEART RATE: 74 BPM

## 2024-06-05 DIAGNOSIS — M79.89 OTHER SPECIFIED SOFT TISSUE DISORDERS: ICD-10-CM

## 2024-06-05 DIAGNOSIS — B02.29 OTHER POSTHERPETIC NERVOUS SYSTEM INVOLVEMENT: ICD-10-CM

## 2024-06-05 DIAGNOSIS — Z87.898 PERSONAL HISTORY OF OTHER SPECIFIED CONDITIONS: ICD-10-CM

## 2024-06-05 DIAGNOSIS — Z00.01 ENCOUNTER FOR GENERAL ADULT MEDICAL EXAMINATION WITH ABNORMAL FINDINGS: ICD-10-CM

## 2024-06-05 DIAGNOSIS — Z72.0 TOBACCO USE: ICD-10-CM

## 2024-06-05 DIAGNOSIS — Z91.89 OTHER SPECIFIED PERSONAL RISK FACTORS, NOT ELSEWHERE CLASSIFIED: ICD-10-CM

## 2024-06-05 PROCEDURE — G0439: CPT

## 2024-06-05 PROCEDURE — G0444 DEPRESSION SCREEN ANNUAL: CPT | Mod: 59

## 2024-06-05 PROCEDURE — G2211 COMPLEX E/M VISIT ADD ON: CPT | Mod: NC

## 2024-06-05 RX ORDER — AMOXICILLIN AND CLAVULANATE POTASSIUM 875; 125 MG/1; MG/1
875-125 TABLET, COATED ORAL DAILY
Qty: 3 | Refills: 0 | Status: ACTIVE | COMMUNITY
Start: 2024-06-05 | End: 1900-01-01

## 2024-06-05 NOTE — HISTORY OF PRESENT ILLNESS
[PMH Reviewed and Updated] : past medical history reviewed and updated [PSH Reviewed and Updated] : past surgical history reviewed and updated [Family History Reviewed and Updated] : family history reviewed and updated [Medication and Allergies Reconciled] : medication and allergies reconciled [Working Part-time] : working part-time [Smoking Status Reviewed and Updated] : Smoking status was reviewed and updated [Not Ready] : is not ready to quit using tobacco [None] : The patient has no concerns about alcohol abuse [Never] : has never used illicit drugs [Compliant with medications] : compliant with medications [Adequate] : adequate [Spouse] : spouse [Friends] : friends [Children] : children [Neighbors] : neighbors [Fully Independent] : fully independent [Drives without concerns] : drives without concerns [No history of falls] : no history of falls [Seatbelts] : seatbelts [Safe Driving Habits] : safe driving habits [Smoke Detectors] : smoke detectors [Carbon Monoxide Detector] : carbon monoxide detector [Bathroom Grab Bars] : bathroom grab bars [Fall Prevention Measures] : fall prevention measures [Advanced Directives Discussed] : discussed at today's visit [Patient Has Full Capacity] : this patient has full decision making capacity for discussion of advance care planning [Patient reported hearing was normal] : Patient reported hearing was normal [Patient reported vision is normal] : Patient reported vision is normal [Patient reported colon/rectal/cancer screening was normal] : Patient reported colon/rectal cancer screening was normal [Patient reported breast cancer screening was normal] : Patient reported breast cancer screening was normal [Patient reported cervical cancer screening was normal] : Patient reported cervical cancer screening was normal [No falls in past year] : Patient reported no falls in the past year [0] : 2) Feeling down, depressed, or hopeless: Not at all (0) [PHQ-2 Negative - No further assessment needed] : PHQ-2 Negative - No further assessment needed [I have developed a follow-up plan documented below in the note.] : I have developed a follow-up plan documented below in the note. [Over the Past 2 Weeks, Have You Felt Down, Depressed, or Hopeless?] : 1.) Over the past 2 weeks, have you felt down, depressed, or hopeless? No [Over the Past 2 Weeks, Have You Felt Little Interest or Pleasure Doing Things?] : 2.) Over the past 2 weeks, have you felt little interest or pleasure doing things? No [Sunscreen] : not using sunscreen [FreeTextEntry1] : She has started taking ginkgo to reduce effects of simvastatin 10mg QHS  #Active smoker getting PFTs soon Counselled smoking cessation. patient has tried all nicotine supplementation   #SLE with RA on plaquinil gets eye exam every 6 months  was advised augmentin QD for 3 days during teeth cleaning otherwise her SLE flares up. Per ID.  2024 ANDREW LOWERY is a 78 year yo White  female is coming in today to establish care. Patient has PMHx as listed below   pain in her shoulder to lift the arms up. CPK 300s raised. pain since 4-5 months.   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  she enjoys smoking since she was 17yo. no alcohol Weight loss/malnutrition: no : ; 2 kids Immunization: done Screening: done Depression screening: done Medication reconciliation: done Allergies: done   exercise not much   4M Geriatric Screening Tests   Based on The 4Ms While Caring for Older Adults, an evidence-based focus on the key areas of mentation, mobility, medications, and what matters most (a guide by the [employer] and the [employer])     Medications: -Anticholinergic burden:n     Mobility:   -Chronic pain:no pain meds -Constipation: no -Urinary symptoms: no     Frail Scale: 0/5   -Are you fatigued? y -Can you walk up one flight of stairs? no -Can you walk one block?  yes -Do you have more than 5 illnesses?[ ] -Have you lost more than 5% of your weight in last 6 months?[ ]       Mentation: -Hx of dementia:n -h/o delirium:n -Cognitive enhancing agents:n       Sleep: 6 hrs STOP-BANG Snoring: n Tiredness:[ ] Observed Apnea:[ ] Pressure: High blood pressure[ ] BMI: higher than 35.[ ] Age: older than 50[ ] Neck Circumference: greater than 16 inches is considered a risk factor.[ ] Gender: Males[ ]     Matters Most     [BreastSonogramDate] : 08/2020 [TextBox_25] : osteopenia -1.7T. will get bone density scan next year. [TextBox_37] : every 6 months [Mammogramdate] : 04/2019 [TextBox_19] : every 5 yrs. due; will reach out to GI [FreeTextEntry3] : 05/2023 [FreeTextEntry4] : due [[id number]] : 0

## 2024-07-03 ENCOUNTER — LABORATORY RESULT (OUTPATIENT)
Age: 79
End: 2024-07-03

## 2024-07-03 ENCOUNTER — APPOINTMENT (OUTPATIENT)
Dept: RHEUMATOLOGY | Facility: CLINIC | Age: 79
End: 2024-07-03
Payer: MEDICARE

## 2024-07-03 VITALS
HEART RATE: 77 BPM | DIASTOLIC BLOOD PRESSURE: 60 MMHG | WEIGHT: 160 LBS | BODY MASS INDEX: 32.25 KG/M2 | OXYGEN SATURATION: 95 % | HEIGHT: 59 IN | SYSTOLIC BLOOD PRESSURE: 124 MMHG

## 2024-07-03 DIAGNOSIS — M35.9 SYSTEMIC INVOLVEMENT OF CONNECTIVE TISSUE, UNSPECIFIED: ICD-10-CM

## 2024-07-03 PROCEDURE — 99204 OFFICE O/P NEW MOD 45 MIN: CPT

## 2024-07-04 LAB
ALBUMIN SERPL ELPH-MCNC: 4.6 G/DL
ALP BLD-CCNC: 69 U/L
ALT SERPL-CCNC: 16 U/L
ANION GAP SERPL CALC-SCNC: 16 MMOL/L
AST SERPL-CCNC: 25 U/L
BILIRUB SERPL-MCNC: 0.5 MG/DL
BUN SERPL-MCNC: 19 MG/DL
CALCIUM SERPL-MCNC: 9.5 MG/DL
CHLORIDE SERPL-SCNC: 102 MMOL/L
CK SERPL-CCNC: 74 U/L
CO2 SERPL-SCNC: 23 MMOL/L
CREAT SERPL-MCNC: 0.85 MG/DL
EGFR: 70 ML/MIN/1.73M2
GLUCOSE SERPL-MCNC: 71 MG/DL
POTASSIUM SERPL-SCNC: 4.4 MMOL/L
PROT SERPL-MCNC: 7 G/DL
RHEUMATOID FACT SER QL: <10 IU/ML
SODIUM SERPL-SCNC: 140 MMOL/L
URATE SERPL-MCNC: 4.5 MG/DL

## 2024-07-07 LAB
BASOPHILS # BLD AUTO: 0.06 K/UL
BASOPHILS NFR BLD AUTO: 0.8 %
C3 SERPL-MCNC: 154 MG/DL
C4 SERPL-MCNC: 38 MG/DL
CCP AB SER IA-ACNC: 9 UNITS
CENTROMERE IGG SER-ACNC: <0.2 AL
DSDNA AB SER-ACNC: 37 IU/ML
ENA JO1 AB SER IA-ACNC: <0.2 AL
ENA RNP AB SER IA-ACNC: <0.2 AL
ENA SCL70 IGG SER IA-ACNC: 0.2 AL
ENA SM AB SER IA-ACNC: <0.2 AL
ENA SS-A AB SER IA-ACNC: <0.2 AL
ENA SS-B AB SER IA-ACNC: <0.2 AL
EOSINOPHIL # BLD AUTO: 0.29 K/UL
EOSINOPHIL NFR BLD AUTO: 4 %
HCT VFR BLD CALC: 40.8 %
HGB BLD-MCNC: 12.4 G/DL
IMM GRANULOCYTES NFR BLD AUTO: 0.1 %
LYMPHOCYTES # BLD AUTO: 1.81 K/UL
LYMPHOCYTES NFR BLD AUTO: 25.1 %
MAN DIFF?: NORMAL
MCHC RBC-ENTMCNC: 19.7 PG
MCHC RBC-ENTMCNC: 30.4 GM/DL
MCV RBC AUTO: 64.7 FL
MONOCYTES # BLD AUTO: 0.67 K/UL
MONOCYTES NFR BLD AUTO: 9.3 %
NEUTROPHILS # BLD AUTO: 4.38 K/UL
NEUTROPHILS NFR BLD AUTO: 60.7 %
PLATELET # BLD AUTO: 178 K/UL
RBC # BLD: 6.31 M/UL
RBC # FLD: 18.9 %
RF+CCP IGG SER-IMP: NEGATIVE
WBC # FLD AUTO: 7.22 K/UL

## 2024-07-08 LAB
ANA PAT FLD IF-IMP: ABNORMAL
ANACR T: ABNORMAL

## 2024-07-09 LAB — HISTONE AB SER QL: 0.5 UNITS

## 2024-07-11 ENCOUNTER — TRANSCRIPTION ENCOUNTER (OUTPATIENT)
Age: 79
End: 2024-07-11

## 2024-07-12 ENCOUNTER — RESULT REVIEW (OUTPATIENT)
Age: 79
End: 2024-07-12

## 2024-08-20 NOTE — PHYSICAL EXAM
[No Edema] : there was no peripheral edema [Normal] : normal gait, coordination grossly intact, no focal deficits and deep tendon reflexes were 2+ and symmetric Detail Level: Zone

## 2024-11-06 ENCOUNTER — APPOINTMENT (OUTPATIENT)
Dept: RHEUMATOLOGY | Facility: CLINIC | Age: 79
End: 2024-11-06

## 2025-01-20 RX ORDER — HYDROXYCHLOROQUINE SULFATE 200 MG/1
200 TABLET, FILM COATED ORAL
Qty: 30 | Refills: 4 | Status: ACTIVE | COMMUNITY
Start: 2025-01-20 | End: 1900-01-01

## 2025-02-26 ENCOUNTER — TRANSCRIPTION ENCOUNTER (OUTPATIENT)
Age: 80
End: 2025-02-26

## 2025-02-26 ENCOUNTER — APPOINTMENT (OUTPATIENT)
Dept: THORACIC SURGERY | Facility: CLINIC | Age: 80
End: 2025-02-26
Payer: MEDICARE

## 2025-02-26 VITALS — BODY MASS INDEX: 31.41 KG/M2 | HEIGHT: 60 IN | WEIGHT: 160 LBS

## 2025-02-26 DIAGNOSIS — F17.210 NICOTINE DEPENDENCE, CIGARETTES, UNCOMPLICATED: ICD-10-CM

## 2025-02-26 DIAGNOSIS — R91.1 SOLITARY PULMONARY NODULE: ICD-10-CM

## 2025-02-26 PROCEDURE — ACP01: CPT | Mod: NC

## 2025-03-08 ENCOUNTER — RESULT REVIEW (OUTPATIENT)
Age: 80
End: 2025-03-08

## 2025-03-21 ENCOUNTER — NON-APPOINTMENT (OUTPATIENT)
Age: 80
End: 2025-03-21

## 2025-03-21 ENCOUNTER — APPOINTMENT (OUTPATIENT)
Dept: RHEUMATOLOGY | Facility: CLINIC | Age: 80
End: 2025-03-21
Payer: MEDICARE

## 2025-03-21 VITALS
OXYGEN SATURATION: 99 % | DIASTOLIC BLOOD PRESSURE: 68 MMHG | HEIGHT: 60 IN | BODY MASS INDEX: 31.41 KG/M2 | SYSTOLIC BLOOD PRESSURE: 130 MMHG | HEART RATE: 82 BPM | WEIGHT: 160 LBS

## 2025-03-21 DIAGNOSIS — M47.816 SPONDYLOSIS W/OUT MYELOPATHY OR RADICULOPATHY, LUMBAR REGION: ICD-10-CM

## 2025-03-21 DIAGNOSIS — M35.9 SYSTEMIC INVOLVEMENT OF CONNECTIVE TISSUE, UNSPECIFIED: ICD-10-CM

## 2025-03-21 PROCEDURE — G2211 COMPLEX E/M VISIT ADD ON: CPT

## 2025-03-21 PROCEDURE — 99214 OFFICE O/P EST MOD 30 MIN: CPT

## 2025-03-21 RX ORDER — GINKGO BILOBA 40 MG
40 TABLET ORAL
Refills: 0 | Status: ACTIVE | COMMUNITY

## 2025-03-21 RX ORDER — ASPIRIN 81 MG
81 TABLET, DELAYED RELEASE (ENTERIC COATED) ORAL
Refills: 0 | Status: ACTIVE | COMMUNITY

## 2025-03-21 RX ORDER — VITAMIN B COMPLEX
CAPSULE ORAL
Refills: 0 | Status: ACTIVE | COMMUNITY

## 2025-04-25 ENCOUNTER — LABORATORY RESULT (OUTPATIENT)
Age: 80
End: 2025-04-25

## 2025-06-06 ENCOUNTER — APPOINTMENT (OUTPATIENT)
Dept: GERIATRICS | Facility: CLINIC | Age: 80
End: 2025-06-06
Payer: MEDICARE

## 2025-06-06 VITALS
HEIGHT: 59 IN | SYSTOLIC BLOOD PRESSURE: 130 MMHG | DIASTOLIC BLOOD PRESSURE: 70 MMHG | HEART RATE: 88 BPM | OXYGEN SATURATION: 99 % | BODY MASS INDEX: 35.28 KG/M2 | WEIGHT: 175 LBS

## 2025-06-06 PROCEDURE — 90471 IMMUNIZATION ADMIN: CPT

## 2025-06-06 PROCEDURE — 90715 TDAP VACCINE 7 YRS/> IM: CPT | Mod: GY

## 2025-06-06 PROCEDURE — G0439: CPT

## 2025-06-06 RX ORDER — BETAMETHASONE DIPROPIONATE 0.5 MG/G
0.05 LOTION TOPICAL TWICE DAILY
Qty: 1 | Refills: 1 | Status: ACTIVE | COMMUNITY
Start: 2025-06-06 | End: 1900-01-01

## 2025-07-18 ENCOUNTER — RESULT REVIEW (OUTPATIENT)
Age: 80
End: 2025-07-18

## 2025-07-21 DIAGNOSIS — R39.15 URGENCY OF URINATION: ICD-10-CM

## 2025-07-21 DIAGNOSIS — Z12.11 ENCOUNTER FOR SCREENING FOR MALIGNANT NEOPLASM OF COLON: ICD-10-CM

## 2025-09-08 ENCOUNTER — RX RENEWAL (OUTPATIENT)
Age: 80
End: 2025-09-08